# Patient Record
Sex: MALE | Employment: OTHER | URBAN - METROPOLITAN AREA
[De-identification: names, ages, dates, MRNs, and addresses within clinical notes are randomized per-mention and may not be internally consistent; named-entity substitution may affect disease eponyms.]

---

## 2022-09-26 ENCOUNTER — EVALUATION (OUTPATIENT)
Dept: PHYSICAL THERAPY | Facility: CLINIC | Age: 72
End: 2022-09-26
Payer: MEDICARE

## 2022-09-26 DIAGNOSIS — M17.12 ARTHRITIS OF KNEE, LEFT: Primary | ICD-10-CM

## 2022-09-26 DIAGNOSIS — Z96.652 STATUS POST TOTAL LEFT KNEE REPLACEMENT: ICD-10-CM

## 2022-09-26 PROCEDURE — 97161 PT EVAL LOW COMPLEX 20 MIN: CPT

## 2022-09-26 PROCEDURE — 97110 THERAPEUTIC EXERCISES: CPT

## 2022-09-26 NOTE — PROGRESS NOTES
PT Evaluation     Today's date: 2022  Patient name: Kenji Sheridan  : 1950  MRN: [id number]  Referring provider: Raphael Kaur DO  Dx: No diagnosis found  Assessment  Assessment details: Pt is a 81year old male that presents to outpatient physical therapy with a L TKA that occurred on 2022  Pt demonstrates increased pain, decreased ROM, decreased strength, decreased activity tolerance, and decreased functional activity due to pain and limited ROM  Education was given on typical physiological response following a TKA as well as the benefit of completing HEP  Pt expressed understanding with HEP  Pt would benefit from skilled physical therapy to address noted impairments, meet patient's goals, and to return to [facility]  Thank you for this referral     Impairments: abnormal gait, abnormal muscle firing, abnormal muscle tone, abnormal or restricted ROM, activity intolerance, impaired physical strength, lacks appropriate home exercise program, pain with function and safety issue    Symptom irritability: lowUnderstanding of Dx/Px/POC: good   Prognosis: good    Goals  STGs:   1  Pt will be able to demonstrate an increase of strength by at least 1/2 grade within 4 weeks   2  Pt will be able to achieve increased ROM by at least 25% within 4 weeks  3  Pt will be able to demonstrate a decrease in swelling by at least 5 cm within 4 weeks   4  Pt will be able to ambulate independently without AD for at least 150 ft within 4 weeks     LTGs:   1  Pt will be independent with all IADLs without pain or discomfort upon discharge  2  Pt will be independent with HEP upon discharge   3  Pt will be able to ambulate at least 300 ft independently without AD without pain upon discharge   4  Pt will be able to report no pain or discomfort with all work duties and recreational activities for a full day upon discharge  5   Pt will be able to achieve at least 120 degrees of PROM to L knee upon discharge Plan  Plan details: Visits: 2-3x a week   Duration: 10-12 weeks   Patient would benefit from: PT eval and skilled physical therapy  Planned modality interventions: cryotherapy, electrical stimulation/Russian stimulation, low level laser therapy, shortwave diathermy, manual electrical stimulation, TENS, thermotherapy: hydrocollator packs, ultrasound and unattended electrical stimulation  Planned therapy interventions: abdominal trunk stabilization, IADL retraining, joint mobilization, manual therapy, massage, Wong taping, muscle pump exercises, neuromuscular re-education, patient education, strengthening, stretching, therapeutic activities, therapeutic exercise, therapeutic training, home exercise program, functional ROM exercises, flexibility, community reintegration, breathing training, balance/weight bearing training, balance, activity modification, ADL retraining, dressing changes and body mechanics training  Treatment plan discussed with: patient        Subjective Evaluation    History of Present Illness  Mechanism of injury: Pt presents to physical therapy S/P L TKA on 9/02/2022  Pt reports that he had 3 weeks of home health therapy  Reports that he is doing well and has been completing the exercises at home daily  Indicates that he is most limited with knee flexion  Ambulates into clinic with Parviz Mckinney  He states that both of his knees have been painful for the last few years, but his R has not been bad since getting a steroid injection  He states that his foot gets numb on occasion since the surgery     Denies redness, denies calf pain    AGGS: prolonged standing, walking, knee flexion  EASES: rest, ice, medication   Goals: decrease pain, return to golf, increase motion   Pain  Current pain rating: 3  At best pain rating: 3  At worst pain rating: 10          Objective     Neurological Testing     Sensation     Knee   Left Knee   Intact: light touch    Right Knee   Intact: light touch     Passive Range of Motion   Left Knee   Flexion: 85 degrees with pain  Extension: -1 degrees     Mobility   Patellar Mobility:   Left Knee   Hypomobile: left medial, left lateral, left superior and left inferior    Tests     Additional Tests Details  TU seconds   5x STS: 27 seconds with SPC    Swelling     Left Knee Girth Measurement (cm)   Joint line: 48 cm  10 cm above joint line: 52 cm  10 cm below joint line: 44 cm             Precautions: L TKA on 2022    Daily Treatment Diary    Date             FOTO IE -             Re-Eval IE               Manuals    Knee PROM             Scar mobs             Patellar mobs                          Neuro Re-Ed                                                                                                Ther Ex    Bike             Quad set              SLR             Bridge             Mini squat 2x10            Calf raises 2x10                                                                             Ther Activity    Step ups/down 4" 10x ea            Step ups 6" 2x10                                                   Gait Training                              Modalities

## 2022-09-28 ENCOUNTER — OFFICE VISIT (OUTPATIENT)
Dept: PHYSICAL THERAPY | Facility: CLINIC | Age: 72
End: 2022-09-28
Payer: MEDICARE

## 2022-09-28 DIAGNOSIS — Z96.652 STATUS POST TOTAL LEFT KNEE REPLACEMENT: ICD-10-CM

## 2022-09-28 DIAGNOSIS — M17.12 ARTHRITIS OF KNEE, LEFT: Primary | ICD-10-CM

## 2022-09-28 PROCEDURE — 97110 THERAPEUTIC EXERCISES: CPT

## 2022-09-28 PROCEDURE — 97530 THERAPEUTIC ACTIVITIES: CPT

## 2022-09-28 PROCEDURE — 97140 MANUAL THERAPY 1/> REGIONS: CPT

## 2022-09-28 NOTE — PROGRESS NOTES
Daily Note     Today's date: 2022  Patient name: Nellie Frank  : 1950  MRN: [id number]  Referring provider: Laura Muñoz DO  Dx:   Encounter Diagnosis     ICD-10-CM    1  Arthritis of knee, left  M17 12    2  Status post total left knee replacement  Z96 652        Start Time: 8239  Stop Time: 1248  Total time in clinic (min): 53 minutes        Subjective: Pt states that he is doing alright today  He states that he has been performing the HEP faithfully  Objective: See treatment diary below      Assessment: Tolerated treatment well  Pt expressed pain with passive knee flexion; achieved 94 degrees of PROM  Activities were also performed to increase LE strength, especially quad strength, to increase functional mobility  Recommended to continue to perform HEP  Gait training was performed without AD; decreased knee flexion on L knee with swing phase of gait  Able to achieve TKE with heel strike  Patient demonstrated fatigue post treatment and would benefit from continued PT      Plan: Continue per plan of care        Precautions: L TKA on 2022    Daily Treatment Diary    Date            FOTO IE -             Re-Eval IE               Manuals    Knee PROM  JM           Scar mobs  JM           Patellar mobs  JM                        Neuro Re-Ed                                            Ther Ex    Bike             Quad set              SLR  2x10           Bridge             Mini squat 2x10 2x10           Calf raises 2x10 2x10           Hip abd machine  2x10 25#           Leg press   3x10 95#    SL 2x10 45#                                                  Ther Activity    Step ups/down 4" 10x ea 6" 10x ea           Step ups 6" 2x10 6" 10x ea                                                  Gait Training                              Modalities

## 2022-09-30 ENCOUNTER — OFFICE VISIT (OUTPATIENT)
Dept: PHYSICAL THERAPY | Facility: CLINIC | Age: 72
End: 2022-09-30
Payer: MEDICARE

## 2022-09-30 DIAGNOSIS — Z96.652 STATUS POST TOTAL LEFT KNEE REPLACEMENT: ICD-10-CM

## 2022-09-30 DIAGNOSIS — M17.12 ARTHRITIS OF KNEE, LEFT: Primary | ICD-10-CM

## 2022-09-30 PROCEDURE — 97110 THERAPEUTIC EXERCISES: CPT

## 2022-09-30 PROCEDURE — 97140 MANUAL THERAPY 1/> REGIONS: CPT

## 2022-09-30 NOTE — PROGRESS NOTES
Daily Note     Today's date: 2022  Patient name: Zayda Kate  : 1950  MRN: [id number]  Referring provider: Dylan Larry DO  Dx:   Encounter Diagnosis     ICD-10-CM    1  Arthritis of knee, left  M17 12    2  Status post total left knee replacement  Z96 652                   Subjective: Pt states that he was in pain following the last session, but slowly started feeling better the next few days  Objective: See treatment diary below      Assessment: Tolerated treatment well  Continues to demonstrate improve ROM with manual techniques  Lacks full functional ROM  Expresses pain with step ups/down on 6" step  Unable to perform eccentric step down due to quad weakness and pain  Recommended to continue HEP and use ice at home  Patient demonstrated fatigue post treatment and would benefit from continued PT      Plan: Continue per plan of care        Precautions: L TKA on 2022    Daily Treatment Diary    Date           FOTO IE -             Re-Eval IE               Manuals    Knee PROM  JM JM          Scar mobs  PAULINO JM          Patellar mobs  PAULINO JM                       Neuro Re-Ed                                            Ther Ex    Bike             Quad set    2x10          SLR  2x10 2x10          Bridge   NV          Mini squat 2x10 2x10 2x10          Calf raises 2x10 2x10 20x          Hip abd machine  2x10 25# Hip abd + ext 10x ea (B)          Leg press   3x10 95#    SL 2x10 45# 3x10 105#    SL 2x10 55#                                                 Ther Activity    Step ups/down 4" 10x ea 6" 10x ea 6" 10x ea          Step ups 6" 2x10 6" 10x ea           Eccentric heel taps   Unable                                     Gait Training                              Modalities    ICE  Perf Perf

## 2022-10-03 ENCOUNTER — OFFICE VISIT (OUTPATIENT)
Dept: PHYSICAL THERAPY | Facility: CLINIC | Age: 72
End: 2022-10-03
Payer: MEDICARE

## 2022-10-03 DIAGNOSIS — M17.12 ARTHRITIS OF KNEE, LEFT: Primary | ICD-10-CM

## 2022-10-03 DIAGNOSIS — Z96.652 STATUS POST TOTAL LEFT KNEE REPLACEMENT: ICD-10-CM

## 2022-10-03 PROCEDURE — 97110 THERAPEUTIC EXERCISES: CPT

## 2022-10-03 PROCEDURE — 97140 MANUAL THERAPY 1/> REGIONS: CPT

## 2022-10-03 NOTE — PROGRESS NOTES
Daily Note     Today's date: 10/3/2022  Patient name: Thu Salas  : 1950  MRN: [id number]  Referring provider: Alexi Zamora DO  Dx:   Encounter Diagnosis     ICD-10-CM    1  Arthritis of knee, left  M17 12    2  Status post total left knee replacement  Z96 652                   Subjective: Pt states that his knee was in a lot of pain over the weekend  Attributes this to the weather  Indicates that he was unable to perform any exercises or stretches due to the pain  Objective: See treatment diary below      Assessment: Tolerated treatment well  Manual therapy was performed to continue to improve knee ROM  Continues to demonstate limited functional knee flexion  Activities progressed to improve LE strength  Unable to perform lateral step up due to pain and weakness  Recommended to continue HEP to improve strength and ROM  Patient demonstrated fatigue post treatment and would benefit from continued PT      Plan: Continue per plan of care        Precautions: L TKA on 2022    Daily Treatment Diary    Date 9/26 9/28 9/30 10/3         FOTO IE -             Re-Eval IE               Manuals    Knee PROM  PAULINO BOB         Scar [place] BOB JM         Patellar mobs  PAULINO BOB JM                      Neuro Re-Ed                                            Ther Ex    Bike             Quad set    2x10 2x10         SLR  2x10 2x10 2x10         Bridge    NV         Mini squat 2x10 2x10 2x10 10x         Calf raises 2x10 2x10 20x          Hip abd machine  2x10 25# Hip abd + ext 10x ea (B)          Leg press   3x10 95#    SL 2x10 45# 3x10 105#    SL 2x10 55#          SAQ    2# 2x10                                   Ther Activity    Step ups/down 4" 10x ea 6" 10x ea 6" 10x ea 6" 10x ea         Step ups 6" 2x10 6" 10x ea           Eccentric heel taps   Unable  4" lateral 15x                                    Gait Training                              Modalities    ICE  Perf Perf

## 2022-10-05 ENCOUNTER — APPOINTMENT (OUTPATIENT)
Dept: PHYSICAL THERAPY | Facility: CLINIC | Age: 72
End: 2022-10-05

## 2022-10-07 ENCOUNTER — OFFICE VISIT (OUTPATIENT)
Dept: PHYSICAL THERAPY | Facility: CLINIC | Age: 72
End: 2022-10-07
Payer: MEDICARE

## 2022-10-07 DIAGNOSIS — M17.12 ARTHRITIS OF KNEE, LEFT: Primary | ICD-10-CM

## 2022-10-07 DIAGNOSIS — Z96.652 STATUS POST TOTAL LEFT KNEE REPLACEMENT: ICD-10-CM

## 2022-10-07 PROCEDURE — 97140 MANUAL THERAPY 1/> REGIONS: CPT

## 2022-10-07 PROCEDURE — 97110 THERAPEUTIC EXERCISES: CPT

## 2022-10-07 NOTE — PROGRESS NOTES
Daily Note     Today's date: 10/7/2022  Patient name: Cha Davey  : 1950  MRN: [id number]  Referring provider: Masha Maria DO  Dx:   Encounter Diagnosis     ICD-10-CM    1  Arthritis of knee, left  M17 12    2  Status post total left knee replacement  Z96 652                   Subjective: Pt states that he was in a lot of pain on Monday and Tuesday this week, but has been feeling much better since  Indicates that he was been performing the HEP  Ambulates into clinic without AD      Objective: See treatment diary below      Assessment: Tolerated treatment well  Progressed weight and reps with exercises today  Demonstrated decreased quad strength and knee ROM with eccentric step down  Recommended to add step down on 4" step to HEP  Able to achieve 100 degrees of PROM  Patient demonstrated fatigue post treatment and would benefit from continued PT      Plan: Continue per plan of care        Precautions: L TKA on 2022    Daily Treatment Diary    Date 9/26 9/28 9/30 10/3 10/7        FOTO IE -             Re-Eval IE               Manuals    Knee PROM  PAULINO BOB JM        Scar mobs  PAULINO BOB JM        Patellar mobs  PAULINO BOB JM                     Neuro Re-Ed                                            Ther Ex    Bike             Quad set    2x10 2x10 2x10        SLR  2x10 2x10 2x10 3x7        Bridge    NV 2x8        Mini squat 2x10 2x10 2x10 10x         Calf raises 2x10 2x10 20x  20x        Hip abd machine  2x10 25# Hip abd + ext 10x ea (B)  Hip abd + ext 10x ea (B)        Leg press   3x10 95#    SL 2x10 45# 3x10 105#    SL 2x10 55#  3x10 115#    SL 2x10 55#        SAQ     2 5# 2x10        LAQ     2 5# 2x10                     Ther Activity    Step ups/down 4" 10x ea 6" 10x ea 6" 10x ea 6" 10x ea         Step ups 6" 2x10 6" 10x ea           Eccentric heel taps   Unable  4" lateral 15x  4" up and over 20x                                  Gait Training                              Modalities    ICE Perf Perf  Perf

## 2022-10-10 ENCOUNTER — OFFICE VISIT (OUTPATIENT)
Dept: PHYSICAL THERAPY | Facility: CLINIC | Age: 72
End: 2022-10-10
Payer: MEDICARE

## 2022-10-10 DIAGNOSIS — Z96.652 STATUS POST TOTAL LEFT KNEE REPLACEMENT: ICD-10-CM

## 2022-10-10 DIAGNOSIS — M17.12 ARTHRITIS OF KNEE, LEFT: Primary | ICD-10-CM

## 2022-10-10 PROCEDURE — 97530 THERAPEUTIC ACTIVITIES: CPT

## 2022-10-10 PROCEDURE — 97140 MANUAL THERAPY 1/> REGIONS: CPT

## 2022-10-10 PROCEDURE — 97110 THERAPEUTIC EXERCISES: CPT

## 2022-10-10 NOTE — PROGRESS NOTES
Daily Note     Today's date: 10/10/2022  Patient name: Darin Yao  : 1950  MRN: [id number]  Referring provider: Isabelle Sanchez DO  Dx:   Encounter Diagnosis     ICD-10-CM    1  Arthritis of knee, left  M17 12    2  Status post total left knee replacement  Z96 652                   Subjective: Pt states that he is doing well  Reports that he can go up the stairs much easier, but still has to descend with UE support and one step at a time leading with the L LE  Objective: See treatment diary below      Assessment: Tolerated treatment well  Tolerated increased activities today with less rest breaks required  Able to achieve 104 degrees of passive knee flexion; lack 4 degrees of passive TKE  Continues to demonstrate limited quad strength with eccentric step down  Reported discomfort in R knee; indicates history of R knee pain  Recommended to continue HEP  Patient demonstrated fatigue post treatment and would benefit from continued PT      Plan: Continue per plan of care        Precautions: L TKA on 2022    Daily Treatment Diary    Date 9/26 9/28 9/30 10/3 10/7 10/10       FOTO IE -             Re-Eval IE               Manuals    Knee PROM  JM PAULINO BOB JM       Scar mobs  PAULINO BOB JM JM       Patellar mobs  PAULINO BOB JM                    Neuro Re-Ed           30"x2                                 Ther Ex    Bike             Quad set    2x10 2x10 2x10        SLR  2x10 2x10 2x10 3x7 2x10       Bridge    NV 2x8        Mini squat 2x10 2x10 2x10 10x         Calf raises 2x10 2x10 20x  20x 20x + toes raises 20x       Hip abd machine  2x10 25# Hip abd + ext 10x ea (B)  Hip abd + ext 10x ea (B) Hip abd 40# 2x10 ea       Leg press   3x10 95#    SL 2x10 45# 3x10 105#    SL 2x10 55#  3x10 115#    SL 2x10 55# 3x10 125#    SL 2x10 55#       SAQ     2 5# 2x10 DC       LAQ     2 5# 2x10 2x10 3#                    Ther Activity    Step ups/down 4" 10x ea 6" 10x ea 6" 10x ea 6" 10x ea  6" 10x ea       Step ups 6" 2x10 6" 10x ea           Eccentric heel taps   Unable  4" lateral 15x  4" up and over 20x 4" up and over 20x                                 Gait Training                              Modalities    ICE  Perf Perf  Perf

## 2022-10-12 ENCOUNTER — OFFICE VISIT (OUTPATIENT)
Dept: PHYSICAL THERAPY | Facility: CLINIC | Age: 72
End: 2022-10-12
Payer: MEDICARE

## 2022-10-12 DIAGNOSIS — M17.12 ARTHRITIS OF KNEE, LEFT: Primary | ICD-10-CM

## 2022-10-12 DIAGNOSIS — Z96.652 STATUS POST TOTAL LEFT KNEE REPLACEMENT: ICD-10-CM

## 2022-10-12 PROCEDURE — 97140 MANUAL THERAPY 1/> REGIONS: CPT

## 2022-10-12 PROCEDURE — 97110 THERAPEUTIC EXERCISES: CPT

## 2022-10-12 NOTE — PROGRESS NOTES
Daily Note     Today's date: 10/12/2022  Patient name: Shikha Watkins  : 1950  MRN: [id number]  Referring provider: Salinas Barrera DO  Dx:   Encounter Diagnosis     ICD-10-CM    1  Arthritis of knee, left  M17 12    2  Status post total left knee replacement  Z96 652                   Subjective: Pt states that his R knee is bothering him more than his L  Reports that his knees worse in the morning, but slowly improve throughout the morning  Indicates that he was a little sore following last session  Objective: See treatment diary below    Passive Range of Motion   Left Knee   Flexion: 106 degrees with pain (85)  Extension: -3 degrees         Tests   Additional Tests Details  TU seconds (18)  5x STS: 18 seconds with UE support (27)     Swelling    Left Knee Girth Measurement (cm)   Joint line: 47 cm (48)  10 cm above joint line: 51 cm (52)  10 cm below joint line: 43 cm (44)    FOTO:     IE - 40  10/12/22 -       Assessment: Tolerated treatment well  Continues to show improvement since starting physical therapy  ROM listed above  Continues to lack functional ROM and strength  Able to performed eccentric step down without UE support on 4" step  Limited stepping activities today due to pain in R knee  Patient demonstrated fatigue post treatment and would benefit from continued PT      Plan: Continue per plan of care        Precautions: L TKA on 2022    Daily Treatment Diary    Date 9/26 9/28 9/30 10/3 10/7 10/10 10/12      FOTO IE -             Re-Eval IE               Manuals    Knee PROM  PAULINO BOB JM      Scar mobs  PAULINO BOB JM      Patellar mobs  PAULINO BOB JM                   Neuro Re-Ed                                            Ther Ex    Bike             Quad set    2x10 2x10 2x10        SLR  2x10 2x10 2x10 3x7 2x10 2x10      Bridge    NV 2x8  2x12      Mini squat 2x10 2x10 2x10 10x         Calf raises 2x10 2x10 20x  20x 20x + toes raises 20x 20x + toes raises 20x Hip abd machine  2x10 25# Hip abd + ext 10x ea (B)  Hip abd + ext 10x ea (B) Hip abd 40# 2x10 ea Hip abd only 2x10 ea      Leg press   3x10 95#    SL 2x10 45# 3x10 105#    SL 2x10 55#  3x10 115#    SL 2x10 55# 3x10 125#    SL 2x10 55# 3x10 135#    SL 2x10 65#      SAQ     2 5# 2x10 DC 4# 2x10      LAQ     2 5# 2x10 2x10 3# 2x10 4#      Lunge stretch on step       10"x10                                             Ther Activity    Step ups/down 4" 10x ea 6" 10x ea 6" 10x ea 6" 10x ea  6" 10x ea       Step ups 6" 2x10 6" 10x ea           Eccentric heel taps   Unable  4" lateral 15x  4" up and over 20x 4" up and over 20x 4" up and over 15x                                Gait Training                              Modalities    ICE  Perf Perf  Perf

## 2022-10-14 ENCOUNTER — OFFICE VISIT (OUTPATIENT)
Dept: PHYSICAL THERAPY | Facility: CLINIC | Age: 72
End: 2022-10-14
Payer: MEDICARE

## 2022-10-14 DIAGNOSIS — M17.12 ARTHRITIS OF KNEE, LEFT: Primary | ICD-10-CM

## 2022-10-14 DIAGNOSIS — Z96.652 STATUS POST TOTAL LEFT KNEE REPLACEMENT: ICD-10-CM

## 2022-10-14 PROCEDURE — 97140 MANUAL THERAPY 1/> REGIONS: CPT

## 2022-10-14 PROCEDURE — 97110 THERAPEUTIC EXERCISES: CPT

## 2022-10-14 PROCEDURE — 97530 THERAPEUTIC ACTIVITIES: CPT

## 2022-10-14 NOTE — PROGRESS NOTES
Daily Note     Today's date: 10/14/2022  Patient name: Prince Dorsey  : 1950  MRN: [id number]  Referring provider: Andres Low DO  Dx:   Encounter Diagnosis     ICD-10-CM    1  Arthritis of knee, left  M17 12    2  Status post total left knee replacement  Z96 652                   Subjective: Pt states that he is feeling 'wonderful' today  Indicates that he still has been having pain with descending stairs, but notices other activities that are getting easier to perform at home  Objective: See treatment diary below      Assessment: Tolerated treatment well  Able to achieve 109 degrees of passive knee flexion; lack 1 degrees of passive TKE  Continues to demonstrate limited quad strength with eccentric step down  Unable to perform step down without pain on 4" step  Recommended to continue to challenge self with stepping down at home to improve strength and confidence  Patient demonstrated fatigue post treatment and would benefit from continued PT      Plan: Continue per plan of care        Precautions: L TKA on 2022    Daily Treatment Diary    Date 9/26 9/28 9/30 10/3 10/7 10/10 10/12 10/14     FOTO IE -             Re-Eval IE               Manuals    Knee PROM  JM JM JM JM JM JM JM     Scar mobs  JM JM JM PAULINO JM JM JM     Patellar mobs  JM PAULINO BOB JM JM JM JM                  Neuro Re-Ed     Tandem stance on foam         60"x1 ea                               Ther Ex    Bike        3 min     Quad set    2x10 2x10 2x10        SLR  2x10 2x10 2x10 3x7 2x10 2x10 2x10 3#     Bridge    NV 2x8  2x12      Mini squat 2x10 2x10 2x10 10x    On foam 2x10     Calf raises 2x10 2x10 20x  20x 20x + toes raises 20x 20x + toes raises 20x 30x + toes raises 20x     Hip abd machine  2x10 25# Hip abd + ext 10x ea (B)  Hip abd + ext 10x ea (B) Hip abd 40# 2x10 ea Hip abd only 2x10 ea Hip abd only 2x10 ea 55#     Leg press   3x10 95#    SL 2x10 45# 3x10 105#    SL 2x10 55#  3x10 115#    SL 2x10 55# 3x10 125#    SL 2x10 55# 3x10 135#    SL 2x10 65# 3x10 155#    SL 2x10 65#     SAQ     2 5# 2x10 DC 4# 2x10      LAQ     2 5# 2x10 2x10 3# 2x10 4# 2x10 5#     Lunge stretch on step       10"x10 10"x10     Side lying hip abd        15x 3#                               Ther Activity    Step ups/down 4" 10x ea 6" 10x ea 6" 10x ea 6" 10x ea  6" 10x ea       Step ups 6" 2x10 6" 10x ea           Eccentric heel taps   Unable  4" lateral 15x  4" up and over 20x 4" up and over 20x 4" up and over 15x 4" up and over 15x    2x5 (L)                               Gait Training                              Modalities    ICE  Perf Perf  Perf   Perf

## 2022-10-17 ENCOUNTER — OFFICE VISIT (OUTPATIENT)
Dept: PHYSICAL THERAPY | Facility: CLINIC | Age: 72
End: 2022-10-17
Payer: MEDICARE

## 2022-10-17 DIAGNOSIS — M17.12 ARTHRITIS OF KNEE, LEFT: Primary | ICD-10-CM

## 2022-10-17 DIAGNOSIS — Z96.652 STATUS POST TOTAL LEFT KNEE REPLACEMENT: ICD-10-CM

## 2022-10-17 PROCEDURE — 97110 THERAPEUTIC EXERCISES: CPT

## 2022-10-17 PROCEDURE — 97530 THERAPEUTIC ACTIVITIES: CPT

## 2022-10-17 PROCEDURE — 97140 MANUAL THERAPY 1/> REGIONS: CPT

## 2022-10-17 NOTE — PROGRESS NOTES
Daily Note     Today's date: 10/17/2022  Patient name: Mandy Bradley  : 1950  MRN: [id number]  Referring provider: Aarti Johnson DO  Dx:   Encounter Diagnosis     ICD-10-CM    1  Arthritis of knee, left  M17 12    2  Status post total left knee replacement  Z96 652                   Subjective: Pt states that he is doing well today  Denies any new changes since last session  Objective: See treatment diary below      Assessment: Tolerated treatment well  Continues to show good progression with therapeutic exercises with increase reps and weight  Lacks progression with functional step downs on L LE due to quad weakness  Education was given on benefit of adding step ups/downs to HEP  Patient demonstrated fatigue post treatment and would benefit from continued PT      Plan: Continue per plan of care        Precautions: L TKA on 2022    Daily Treatment Diary    Date 9/26 9/28 9/30 10/3 10/7 10/10 10/12 10/14 10/17    FOTO IE -             Re-Eval IE               Manuals    Knee PROM  PAULINO BOB JM    Scar mobs  PAULINO BOB JM    Patellar mobs  PAULINO BOB JM                 Neuro Re-Ed     Tandem stance on foam         60"x1 ea                               Ther Ex    Bike        3 min 5 min    Quad set    2x10 2x10 2x10        SLR  2x10 2x10 2x10 3x7 2x10 2x10 2x10 3# 2x10 3#    Bridge    NV 2x8  2x12      Mini squat 2x10 2x10 2x10 10x    On foam 2x10 2x10    Calf raises 2x10 2x10 20x  20x 20x + toes raises 20x 20x + toes raises 20x 30x + toes raises 20x 20x 30# + toes raises 20x    Hip abd machine  2x10 25# Hip abd + ext 10x ea (B)  Hip abd + ext 10x ea (B) Hip abd 40# 2x10 ea Hip abd only 2x10 ea Hip abd only 2x10 ea 55# Hip abd only 2x10 ea 55#    Leg press   3x10 95#    SL 2x10 45# 3x10 105#    SL 2x10 55#  3x10 115#    SL 2x10 55# 3x10 125#    SL 2x10 55# 3x10 135#    SL 2x10 65# 3x10 155#    SL 2x10 65# 3x10 185#    SL 3x10 75#    SAQ     2 5# 2x10 DC 4# 2x10 LAQ     2 5# 2x10 2x10 3# 2x10 4# 2x10 5# 2x10 7#    Lunge stretch on step       10"x10 10"x10     Side lying hip abd        15x 3#                               Ther Activity    Step ups/down 4" 10x ea 6" 10x ea 6" 10x ea 6" 10x ea  6" 10x ea   6" 15x ea    Step ups 6" 2x10 6" 10x ea           Eccentric heel taps   Unable  4" lateral 15x  4" up and over 20x 4" up and over 20x 4" up and over 15x 4" up and over 15x    2x5 (L) 4" up and over 15x                              Gait Training                              Modalities    ICE  Perf Perf  Perf   Perf

## 2022-10-19 ENCOUNTER — OFFICE VISIT (OUTPATIENT)
Dept: PHYSICAL THERAPY | Facility: CLINIC | Age: 72
End: 2022-10-19
Payer: MEDICARE

## 2022-10-19 DIAGNOSIS — Z96.652 STATUS POST TOTAL LEFT KNEE REPLACEMENT: ICD-10-CM

## 2022-10-19 DIAGNOSIS — M17.12 ARTHRITIS OF KNEE, LEFT: Primary | ICD-10-CM

## 2022-10-19 PROCEDURE — 97140 MANUAL THERAPY 1/> REGIONS: CPT

## 2022-10-19 PROCEDURE — 97110 THERAPEUTIC EXERCISES: CPT

## 2022-10-19 NOTE — PROGRESS NOTES
Daily Note     Today's date: 10/19/2022  Patient name: Trang Molina  : 1950  MRN: [id number]  Referring provider: Fracisco Cheema DO  Dx:   Encounter Diagnosis     ICD-10-CM    1  Arthritis of knee, left  M17 12    2  Status post total left knee replacement  Z96 652                   Subjective: Pt states that he was very sore following last session on Monday  Reports that he still has some achy feeling today  Objective: See treatment diary below    PROM knee flexion: 110 degrees         Assessment: Tolerated treatment well  Physical therapy slightly decreased strengthening activities today due to reports of symptoms prior to therapy  Manual therapies were performed to increase ROM; continues to show improve ROM  Able to perform step down on 6" step with UE support  Patient demonstrated fatigue post treatment and would benefit from continued PT      Plan: Continue per plan of care        Precautions: L TKA on 2022    Daily Treatment Diary    Date  10/3 10/7 10/10 10/12 10/14 10/17 10/19   FOTO IE -             Re-Eval IE               Manuals    Knee PROM  JM PAULINO BOB JM JM + prone   Scar mobs  JM JM PAULINO BOB JM JM   Patellar mobs  JM PAULINO BOB JM                Neuro Re-Ed     Tandem stance on foam         60"x1 ea                               Ther Ex    Bike        3 min 5 min 6 min   Quad set    2x10 2x10 2x10        SLR  2x10 2x10 2x10 3x7 2x10 2x10 2x10 3# 2x10 3# 2x10 3#   Bridge    NV 2x8  2x12   2x10   Mini squat 2x10 2x10 2x10 10x    On foam 2x10 2x10    Calf raises 2x10 2x10 20x  20x 20x + toes raises 20x 20x + toes raises 20x 30x + toes raises 20x 20x 30# + toes raises 20x    Hip abd machine  2x10 25# Hip abd + ext 10x ea (B)  Hip abd + ext 10x ea (B) Hip abd 40# 2x10 ea Hip abd only 2x10 ea Hip abd only 2x10 ea 55# Hip abd only 2x10 ea 55# Hip abd 40# 2x10 ea   Leg press   3x10 95#    SL 2x10 45# 3x10 105#    SL 2x10 55#  3x10 115#    SL 2x10 55# 3x10 125#    SL 2x10 55# 3x10 135#    SL 2x10 65# 3x10 155#    SL 2x10 65# 3x10 185#    SL 3x10 75# 3x10 185#    SL 3x10 75#   SAQ     2 5# 2x10 DC 4# 2x10      LAQ     2 5# 2x10 2x10 3# 2x10 4# 2x10 5# 2x10 7# 2x10 5#   Lunge stretch on step       10"x10 10"x10     Side lying hip abd        15x 3#  2x10 3#   Hamstring curl          3x10 80#                Ther Activity    Step ups/down 4" 10x ea 6" 10x ea 6" 10x ea 6" 10x ea  6" 10x ea   6" 15x ea 6" 15x ea   Step ups 6" 2x10 6" 10x ea           Eccentric heel taps   Unable  4" lateral 15x  4" up and over 20x 4" up and over 20x 4" up and over 15x 4" up and over 15x    2x5 (L) 4" up and over 15x                              Gait Training                              Modalities    ICE  Perf Perf  Perf   Perf Perf Perf

## 2022-10-21 ENCOUNTER — EVALUATION (OUTPATIENT)
Dept: PHYSICAL THERAPY | Facility: CLINIC | Age: 72
End: 2022-10-21
Payer: MEDICARE

## 2022-10-21 DIAGNOSIS — Z96.652 STATUS POST TOTAL LEFT KNEE REPLACEMENT: ICD-10-CM

## 2022-10-21 DIAGNOSIS — M17.12 ARTHRITIS OF KNEE, LEFT: Primary | ICD-10-CM

## 2022-10-21 PROCEDURE — 97110 THERAPEUTIC EXERCISES: CPT

## 2022-10-21 PROCEDURE — 97140 MANUAL THERAPY 1/> REGIONS: CPT

## 2022-10-21 PROCEDURE — 97530 THERAPEUTIC ACTIVITIES: CPT

## 2022-10-21 NOTE — LETTER
2022    DO Alejandro  [address] [facility]  [address] [place] [address]    Patient: Alfa Morales  YOB: 1950   Date of Visit: 10/21/2022     Encounter Diagnosis     ICD-10-CM    1  Arthritis of knee, left  M17 12    2  Status post total left knee replacement  Z96 652        Dear Dr Bindu Temple:    Thank you for your recent referral of Alfa Morales    Please review the attached evaluation summary from Jaleel's recent visit  Please verify that you agree with the plan of care by signing the attached order  If you have any questions or concerns, please do not hesitate to call  I sincerely appreciate the opportunity to share in the care of one of your patients and hope to have another opportunity to work with you in the near future  Sincerely,    Tamara Linares, PT      Referring Provider:      I certify that I have read the below Plan of Care and certify the need for these services furnished under this plan of treatment while under my care  DO Alejandro  [address] [place] [address]  [address] [place] [address]  Via Fax: [phone]          PT Re-Evaluation     Today's date: 10/21/2022  Patient name: Alfa Morales  : 1950  MRN: [id number]  Referring provider: Asha Em DO  Dx:   Encounter Diagnosis     ICD-10-CM    1  Arthritis of knee, left  M17 12    2  Status post total left knee replacement  Z96 [id number]                   Assessment  Assessment details: Pt is a 81year old male that presents to outpatient physical therapy with a L TKA that occurred on 2022  Pt has shown good progression with physical therapy  Has demonstrated improve ROM, decreased overall pain, and improve strength  Pt continues to demonstrate increased pain with activities that required functional knee flexion, decreased ROM, decreased functional strength, decreased activity tolerance, and decreased functional activity due to pain and limited ROM    Pt would benefit from continued skilled physical therapy to address noted impairments, meet patient's goals, and to return to [facility]  Thank you for this referral     Impairments: abnormal gait, abnormal muscle firing, abnormal muscle tone, abnormal or restricted ROM, activity intolerance, impaired physical strength, lacks appropriate home exercise program, pain with function and safety issue    Symptom irritability: lowUnderstanding of Dx/Px/POC: good   Prognosis: good    Goals  STGs: (updated 10/21/2022)  1  Pt will be able to demonstrate an increase of strength by at least 1/2 grade within 4 weeks  (MET)  2  Pt will be able to achieve increased ROM by at least 25% within 4 weeks  (MET)  3  Pt will be able to demonstrate a decrease in swelling by at least 5 cm within 4 weeks (MET)  4  Pt will be able to ambulate independently without AD for at least 150 ft within 4 weeks (MET)    5  Pt will be able to navigate at least three 6" steps without UE in a reciprocal pattern within 4 weeks (NEW goal)   6  Pt will be able to achieve at least 115 degrees of passive ROM within 4 weeks (NEW goals)       LTGs: (all PROGRESSING)  1  Pt will be independent with all IADLs without pain or discomfort upon discharge  2  Pt will be independent with HEP upon discharge   3  Pt will be able to ambulate at least 300 ft independently without AD without pain upon discharge   4  Pt will be able to report no pain or discomfort with all work duties and recreational activities for a full day upon discharge  5   Pt will be able to achieve at least 120 degrees of PROM to L knee upon discharge     Plan  Plan details: Visits: 2-3x a week   Duration: 7-8 weeks   Patient would benefit from: PT eval and skilled physical therapy  Planned modality interventions: cryotherapy, electrical stimulation/Russian stimulation, thermotherapy: hydrocollator packs and unattended electrical stimulation  Planned therapy interventions: abdominal trunk stabilization, IADL retraining, joint mobilization, manual therapy, massage, muscle pump exercises, neuromuscular re-education, patient education, strengthening, stretching, therapeutic activities, therapeutic exercise, therapeutic training, home exercise program, functional ROM exercises, flexibility, breathing training, balance/weight bearing training, balance, ADL retraining and body mechanics training  Treatment plan discussed with: patient        Subjective Evaluation    History of Present Illness  Mechanism of injury: Pt presents to physical therapy S/P L TKA on 2022  Pt reports that he had 3 weeks of home health therapy  Reports that he is doing well and has been completing the exercises at home daily  Indicates that he is most limited with knee flexion  Ambulates into clinic with [facility]  He states that both of his knees have been painful for the last few years, but his R has not been bad since getting a steroid injection  He states that his foot gets numb on occasion since the surgery  Denies redness, denies calf pain    AGGS: prolonged standing, walking, knee flexion  EASES: rest, ice, medication   Goals: decrease pain, return to golf, increase motion     Re-Evaluation (10/21/2022): Pt states that he feels that he is about 80% better since starting out patient physical therapy  States that     Pain  Current pain ratin  At best pain ratin  At worst pain ratin          Objective     Passive Range of Motion   Left Knee   Flexion: 112 degrees with pain  Extension: 0 degrees     Mobility   Patellar Mobility:   Left Knee   WFL: medial, lateral, superior and inferior       Strength/Myotome Testing     Left Knee   Flexion: 4+  Extension: 5  Quadriceps contraction: good    Right Knee   Normal strength  Quadriceps contraction: good    Tests     Additional Tests Details  TU seconds   5x STS: 19 seconds     Swelling     Left Knee Girth Measurement (cm)   Joint line: 47 cm  10 cm above joint line: 51 cm  10 cm below joint line: 42 cm             Precautions: L TKA on 9/02/2022    Daily Treatment Diary    Date 10/21     10/10 10/12 10/14 10/17 10/19   FOTO             Re-Eval                Manuals    Knee PROM JM     PAULINO BOB JM JM + prone   Scar mobs      PAULINO BOB JM   Patellar mobs PAULINO BOB JM JM                Neuro Re-Ed     Tandem stance on foam         60"x1 ea                               Ther Ex    Bike 6 min       3 min 5 min 6 min   Quad set              SLR      2x10 2x10 2x10 3# 2x10 3# 2x10 3#   Bridge 2x10      2x12   2x10   Mini squat        On foam 2x10 2x10    Calf raises      20x + toes raises 20x 20x + toes raises 20x 30x + toes raises 20x 20x 30# + toes raises 20x    Hip abd machine Hip abd only 2x10 ea 55#     Hip abd 40# 2x10 ea Hip abd only 2x10 ea Hip abd only 2x10 ea 55# Hip abd only 2x10 ea 55# Hip abd 40# 2x10 ea   Leg press  3x10 205#    SL 3x10 85#     3x10 125#    SL 2x10 55# 3x10 135#    SL 2x10 65# 3x10 155#    SL 2x10 65# 3x10 185#    SL 3x10 75# 3x10 185#    SL 3x10 75#   SAQ      DC 4# 2x10      LAQ      2x10 3# 2x10 4# 2x10 5# 2x10 7# 2x10 5#   Lunge stretch on step 10"x10      10"x10 10"x10     Side lying hip abd        15x 3#  2x10 3#   Hamstring curl 3x10 80#         3x10 80#                Ther Activity    Step ups/down 6" 15x ea     6" 10x ea   6" 15x ea 6" 15x ea   Step ups             Eccentric heel taps 6" 2x10     4" up and over 20x 4" up and over 15x 4" up and over 15x    2x5 (L) 4" up and over 15x                              Gait Training                              Modalities    ICE        Perf Perf Perf

## 2022-10-21 NOTE — PROGRESS NOTES
PT Re-Evaluation     Today's date: 10/21/2022  Patient name: Nanette Bronson  : 1950  MRN: [id number]  Referring provider: Tim Beach DO  Dx:   Encounter Diagnosis     ICD-10-CM    1  Arthritis of knee, left  M17 12    2  Status post total left knee replacement  Z96 652                   Assessment  Assessment details: Pt is a 81year old male that presents to outpatient physical therapy with a L TKA that occurred on 2022  Pt has shown good progression with physical therapy  Has demonstrated improve ROM, decreased overall pain, and improve strength  Pt continues to demonstrate increased pain with activities that required functional knee flexion, decreased ROM, decreased functional strength, decreased activity tolerance, and decreased functional activity due to pain and limited ROM  Pt would benefit from continued skilled physical therapy to address noted impairments, meet patient's goals, and to return to [facility]  Thank you for this referral     Impairments: abnormal gait, abnormal muscle firing, abnormal muscle tone, abnormal or restricted ROM, activity intolerance, impaired physical strength, lacks appropriate home exercise program, pain with function and safety issue    Symptom irritability: lowUnderstanding of Dx/Px/POC: good   Prognosis: good    Goals  STGs: (updated 10/21/2022)  1  Pt will be able to demonstrate an increase of strength by at least 1/2 grade within 4 weeks  (MET)  2  Pt will be able to achieve increased ROM by at least 25% within 4 weeks  (MET)  3  Pt will be able to demonstrate a decrease in swelling by at least 5 cm within 4 weeks (MET)  4  Pt will be able to ambulate independently without AD for at least 150 ft within 4 weeks (MET)    5  Pt will be able to navigate at least three 6" steps without UE in a reciprocal pattern within 4 weeks (NEW goal)   6   Pt will be able to achieve at least 115 degrees of passive ROM within 4 weeks (NEW goals)       LTGs: (all PROGRESSING)  1  Pt will be independent with all IADLs without pain or discomfort upon discharge  2  Pt will be independent with HEP upon discharge   3  Pt will be able to ambulate at least 300 ft independently without AD without pain upon discharge   4  Pt will be able to report no pain or discomfort with all work duties and recreational activities for a full day upon discharge  5  Pt will be able to achieve at least 120 degrees of PROM to L knee upon discharge     Plan  Plan details: Visits: 2-3x a week   Duration: 7-8 weeks   Patient would benefit from: PT eval and skilled physical therapy  Planned modality interventions: cryotherapy, electrical stimulation/Russian stimulation, thermotherapy: hydrocollator packs and unattended electrical stimulation  Planned therapy interventions: abdominal trunk stabilization, IADL retraining, joint mobilization, manual therapy, massage, muscle pump exercises, neuromuscular re-education, patient education, strengthening, stretching, therapeutic activities, therapeutic exercise, therapeutic training, home exercise program, functional ROM exercises, flexibility, breathing training, balance/weight bearing training, balance, ADL retraining and body mechanics training  Treatment plan discussed with: patient        Subjective Evaluation    History of Present Illness  Mechanism of injury: Pt presents to physical therapy S/P L TKA on 9/02/2022  Pt reports that he had 3 weeks of home health therapy  Reports that he is doing well and has been completing the exercises at home daily  Indicates that he is most limited with knee flexion  Ambulates into clinic with [facility]  He states that both of his knees have been painful for the last few years, but his R has not been bad since getting a steroid injection  He states that his foot gets numb on occasion since the surgery     Denies redness, denies calf pain    AGGS: prolonged standing, walking, knee flexion  EASES: rest, ice, medication   Goals: decrease pain, return to golf, increase motion     Re-Evaluation (10/21/2022): Pt states that he feels that he is about 80% better since starting out patient physical therapy  States that     Pain  Current pain ratin  At best pain ratin  At worst pain ratin          Objective     Passive Range of Motion   Left Knee   Flexion: 112 degrees with pain  Extension: 0 degrees     Mobility   Patellar Mobility:   Left Knee   WFL: medial, lateral, superior and inferior       Strength/Myotome Testing     Left Knee   Flexion: 4+  Extension: 5  Quadriceps contraction: good    Right Knee   Normal strength  Quadriceps contraction: good    Tests     Additional Tests Details  TU seconds   5x STS: 19 seconds     Swelling     Left Knee Girth Measurement (cm)   Joint line: 47 cm  10 cm above joint line: 51 cm  10 cm below joint line: 42 cm             Precautions: L TKA on 2022    Daily Treatment Diary    Date 10/21     10/10 10/12 10/14 10/17 10/19   FOTO             Re-Eval                Manuals    Knee PROM PAULINO BOB JM + prone   Scar mobs      PAULINO BOB JM   Patellar mobs PAULINO BOB JM                Neuro Re-Ed     Tandem stance on foam         60"x1 ea                               Ther Ex    Bike 6 min       3 min 5 min 6 min   Quad set              SLR      2x10 2x10 2x10 3# 2x10 3# 2x10 3#   Bridge 2x10      2x12   2x10   Mini squat        On foam 2x10 2x10    Calf raises      20x + toes raises 20x 20x + toes raises 20x 30x + toes raises 20x 20x 30# + toes raises 20x    Hip abd machine Hip abd only 2x10 ea 55#     Hip abd 40# 2x10 ea Hip abd only 2x10 ea Hip abd only 2x10 ea 55# Hip abd only 2x10 ea 55# Hip abd 40# 2x10 ea   Leg press  3x10 205#    SL 3x10 85#     3x10 125#    SL 2x10 55# 3x10 135#    SL 2x10 65# 3x10 155#    SL 2x10 65# 3x10 185#    SL 3x10 75# 3x10 185#    SL 3x10 75#   SAQ      DC 4# 2x10      LAQ      2x10 3# 2x10 4# 2x10 5# 2x10 7# 2x10 5#   Lunge stretch on step 10"x10      10"x10 10"x10     Side lying hip abd        15x 3#  2x10 3#   Hamstring curl 3x10 80#         3x10 80#                Ther Activity    Step ups/down 6" 15x ea     6" 10x ea   6" 15x ea 6" 15x ea   Step ups             Eccentric heel taps 6" 2x10     4" up and over 20x 4" up and over 15x 4" up and over 15x    2x5 (L) 4" up and over 15x                              Gait Training                              Modalities    ICE        Perf Perf Perf

## 2022-10-24 ENCOUNTER — OFFICE VISIT (OUTPATIENT)
Dept: PHYSICAL THERAPY | Facility: CLINIC | Age: 72
End: 2022-10-24
Payer: MEDICARE

## 2022-10-24 DIAGNOSIS — Z96.652 STATUS POST TOTAL LEFT KNEE REPLACEMENT: ICD-10-CM

## 2022-10-24 DIAGNOSIS — M17.12 ARTHRITIS OF KNEE, LEFT: Primary | ICD-10-CM

## 2022-10-24 PROCEDURE — 97112 NEUROMUSCULAR REEDUCATION: CPT

## 2022-10-24 PROCEDURE — 97110 THERAPEUTIC EXERCISES: CPT

## 2022-10-24 PROCEDURE — 97140 MANUAL THERAPY 1/> REGIONS: CPT

## 2022-10-26 ENCOUNTER — OFFICE VISIT (OUTPATIENT)
Dept: PHYSICAL THERAPY | Facility: CLINIC | Age: 72
End: 2022-10-26
Payer: MEDICARE

## 2022-10-26 DIAGNOSIS — M17.12 ARTHRITIS OF KNEE, LEFT: Primary | ICD-10-CM

## 2022-10-26 DIAGNOSIS — Z96.652 STATUS POST TOTAL LEFT KNEE REPLACEMENT: ICD-10-CM

## 2022-10-26 PROCEDURE — 97140 MANUAL THERAPY 1/> REGIONS: CPT

## 2022-10-26 PROCEDURE — 97110 THERAPEUTIC EXERCISES: CPT

## 2022-10-26 PROCEDURE — 97112 NEUROMUSCULAR REEDUCATION: CPT

## 2022-10-26 NOTE — PROGRESS NOTES
Daily Note     Today's date: 10/26/2022  Patient name: Gina Reobllar  : 1950  MRN: [id number]  Referring provider: Talia Zheng DO  Dx:   Encounter Diagnosis     ICD-10-CM    1  Arthritis of knee, left  M17 12    2  Status post total left knee replacement  Z96 [id number]                   Subjective: Pt states that he is doing well  Denies any changes since last session  Objective: See treatment diary below  PROM knee flexion: 117 degrees       Assessment: Tolerated treatment well  Continues to demonstrate improve knee ROM as well as LE strength  Able to perform 6" step down with adequate eccentric control with left knee  Unable to perform single leg stance with dynamic UE activities due to limited balance, bilaterally  Patient demonstrated fatigue post treatment and would benefit from continued PT      Plan: Continue per plan of care        Precautions: L TKA on 2022    Daily Treatment Diary    Date 10/21 10/24 10/26   10/10 10/12 10/14 10/17 10/19   FOTO             Re-Eval                Manuals    Knee PROM PAULINO BOB JM + prone   Scar mobs      PAULINO BOB JM   Patellar mobs PAULINO BOB JM                Neuro Re-Ed     Tandem stance on foam   30"x2 30"x1     60"x1 ea     SL on foam wi/ taps on cone  7x 2x5          SL cone pick ups  2x3 3x                       Ther Ex    Bike 6 min 6 min 8 min     3 min 5 min 6 min   Hip abd machine Hip abd only 2x10 ea 55#  Hip abd only 2x10 ea 55#   Hip abd 40# 2x10 ea Hip abd only 2x10 ea Hip abd only 2x10 ea 55# Hip abd only 2x10 ea 55# Hip abd 40# 2x10 ea   Leg press  3x10 205#    SL 3x10 85# 2x20 225#    SL 3x10 85# 2x15 225#    SL 2x20 125#   3x10 125#    SL 2x10 55# 3x10 135#    SL 2x10 65# 3x10 155#    SL 2x10 65# 3x10 185#    SL 3x10 75# 3x10 185#    SL 3x10 75#   Lunge stretch on step 10"x10      10"x10 10"x10     Hamstring curl 3x10 80# 3x10 80# 3x10 90#       3x10 80#                Ther Activity    Step ups/down 6" 15x ea 6" 15x ea    6" 10x ea   6" 15x ea 6" 15x ea   Step ups   8" 20x           Eccentric heel taps 6" 2x10 6" 2x10 6" 2x10   4" up and over 20x 4" up and over 15x 4" up and over 15x    2x5 (L) 4" up and over 15x    Sit to stands  2x10 w/ foam on seat                        Gait Training                              Modalities    ICE Perf Perf      Perf Perf Perf

## 2022-10-28 ENCOUNTER — OFFICE VISIT (OUTPATIENT)
Dept: PHYSICAL THERAPY | Facility: CLINIC | Age: 72
End: 2022-10-28
Payer: MEDICARE

## 2022-10-28 DIAGNOSIS — M17.12 ARTHRITIS OF KNEE, LEFT: Primary | ICD-10-CM

## 2022-10-28 DIAGNOSIS — Z96.652 STATUS POST TOTAL LEFT KNEE REPLACEMENT: ICD-10-CM

## 2022-10-28 PROCEDURE — 97140 MANUAL THERAPY 1/> REGIONS: CPT

## 2022-10-28 PROCEDURE — 97110 THERAPEUTIC EXERCISES: CPT

## 2022-10-28 PROCEDURE — 97530 THERAPEUTIC ACTIVITIES: CPT

## 2022-10-28 NOTE — PROGRESS NOTES
Daily Note     Today's date: 10/28/2022  Patient name: Alia Arriaga  : 1950  MRN: [id number]  Referring provider: Nik Blue DO  Dx:   Encounter Diagnosis     ICD-10-CM    1  Arthritis of knee, left  M17 12    2  Status post total left knee replacement  Z96 [id number]                   Subjective: Pt states that he is feeling stiff and sore today  Objective: See treatment diary below      Assessment: Tolerated treatment well  Continues to show good progression with ROM and strengthening activities  Still benefiting from outpatient vs home physical therapy only for use of specialized equipment, skilled progressions,  guarding with balance activities and manual therapy  Patient demonstrated fatigue post treatment and would benefit from continued PT      Plan: Continue per plan of care        Precautions: L TKA on 2022    Daily Treatment Diary    Date 10/21 10/24 10/26 10/28  10/10 10/12 10/14 10/17 10/19   FOTO             Re-Eval                Manuals    Knee PROM PAULINO BOB JM JM + prone   Scar mobs      PAULINO BOB JM   Patellar mobs PAULINO BOB JM                Neuro Re-Ed     Tandem stance on foam   30"x2 30"x1 30"x2    60"x1 ea     SL on foam wi/ taps on cone  7x 2x5          SL cone pick ups  2x3 3x                       Ther Ex    Bike 6 min 6 min 8 min 7 min    3 min 5 min 6 min   Hip abd machine Hip abd only 2x10 ea 55#  Hip abd only 2x10 ea 55# Hip abd only 2x10 ea 100#  Hip abd 40# 2x10 ea Hip abd only 2x10 ea Hip abd only 2x10 ea 55# Hip abd only 2x10 ea 55# Hip abd 40# 2x10 ea   Leg press  3x10 205#    SL 3x10 85# 2x20 225#    SL 3x10 85# 2x15 225#    SL 2x20 125# 2x15 265#      3x10 125#    SL 2x10 55# 3x10 135#    SL 2x10 65# 3x10 155#    SL 2x10 65# 3x10 185#    SL 3x10 75# 3x10 185#    SL 3x10 75#   Lunge stretch on step 10"x10      10"x10 10"x10     Hamstring curl 3x10 80# 3x10 80# 3x10 90# 2x20 95#      3x10 80#                Ther Activity    Step ups/down 6" 15x ea 6" 15x ea    6" 10x ea   6" 15x ea 6" 15x ea   Step ups   8" 20x  8" 20x     Lat   2x10 8"         Eccentric heel taps 6" 2x10 6" 2x10 6" 2x10 6" 2x10  4" up and over 20x 4" up and over 15x 4" up and over 15x    2x5 (L) 4" up and over 15x    Sit to stands  2x10 w/ foam on seat                        Gait Training                              Modalities    ICE Perf Perf R Bronson Juanito [address]

## 2022-11-02 ENCOUNTER — APPOINTMENT (OUTPATIENT)
Dept: PHYSICAL THERAPY | Facility: CLINIC | Age: 72
End: 2022-11-02

## 2022-11-04 ENCOUNTER — OFFICE VISIT (OUTPATIENT)
Dept: PHYSICAL THERAPY | Facility: CLINIC | Age: 72
End: 2022-11-04

## 2022-11-04 DIAGNOSIS — Z96.652 STATUS POST TOTAL LEFT KNEE REPLACEMENT: ICD-10-CM

## 2022-11-04 DIAGNOSIS — M17.12 ARTHRITIS OF KNEE, LEFT: Primary | ICD-10-CM

## 2022-11-04 NOTE — PROGRESS NOTES
Daily Note     Today's date: 2022  Patient name: Kurtis Anders  : 1950  MRN: [id number]  Referring provider: Claire Kumar DO  Dx:   Encounter Diagnosis     ICD-10-CM    1  Arthritis of knee, left  M17 12    2  Status post total left knee replacement  Z96 [id number]                   Subjective: Pt states that his left knee does not bother him much throughout the day  Indicates that he feels achy at night and stiff in the morning  Reports that he is getting an injection in the R knee next week  Objective: See treatment diary below      Assessment: Tolerated treatment well  Continues to demonstrate improve knee ROM as well as LE strength  Able to perform 6" step down with adequate eccentric control with left knee  States that the more eccentric controlled step downs he performed the easier they became  Also reports R knee pain throughout session  Patient demonstrated fatigue post treatment and would benefit from continued PT      Plan: Continue per plan of care        Precautions: L TKA on 2022    Daily Treatment Diary    Date 10/21 10/24 10/26 10/28 11/4        FOTO             Re-Eval                Manuals    Knee PROM PAULINO BOB JM        Scar mobs             Patellar mobs PAULINO BOB JM                     Neuro Re-Ed     Tandem stance on foam   30"x2 30"x1 30"x2         SL on foam wi/ taps on cone  7x 2x5          SL cone pick ups  2x3 3x                       Ther Ex    Bike 6 min 6 min 8 min 7 min 5 min         Hip abd machine Hip abd only 2x10 ea 55#  Hip abd only 2x10 ea 55# Hip abd only 2x10 ea 100# Hip abd only 2x10 ea 100#        Leg press  3x10 205#    SL 3x10 85# 2x20 225#    SL 3x10 85# 2x15 225#    SL 2x20 125# 2x15 265#     2x20 265#     SL 2x20 135#        Lunge stretch on step 10"x10            Hamstring curl 3x10 80# 3x10 80# 3x10 90# 2x20 95# 2x20 105#                     Ther Activity    Step ups/down 6" 15x ea 6" 15x ea   6" 2x10 ea        Step ups   8" 20x  8" 20x Lat  2x10 8" 6" 2x10 ea        Eccentric heel taps 6" 2x10 6" 2x10 6" 2x10 6" 2x10 4" 2x10        Sit to stands  2x10 w/ foam on seat                        Gait Training                              Modalities    ICE Perf Perf Perf Perf

## 2022-11-07 ENCOUNTER — OFFICE VISIT (OUTPATIENT)
Dept: PHYSICAL THERAPY | Facility: CLINIC | Age: 72
End: 2022-11-07

## 2022-11-07 DIAGNOSIS — M17.12 ARTHRITIS OF KNEE, LEFT: Primary | ICD-10-CM

## 2022-11-07 DIAGNOSIS — Z96.652 STATUS POST TOTAL LEFT KNEE REPLACEMENT: ICD-10-CM

## 2022-11-07 NOTE — PROGRESS NOTES
Daily Note     Today's date: 2022  Patient name: Teri Ndiaye  : 1950  MRN: [id number]  Referring provider: Fleeta Sacks, DO  Dx:   Encounter Diagnosis     ICD-10-CM    1  Arthritis of knee, left  M17 12    2  Status post total left knee replacement  [id number]                   Subjective: Pt denies changes since last session  Reports that he has a follow up with orthopedic this week  States that his R knee is worse than his L knee  Objective: See treatment diary below      Assessment: Tolerated treatment well  Continues strengthening and ROM activities to progress activity tolerance  Denied pain in L knee throughout session  Has shown good progression with physical therapy  ROM - WNL  Strength - WFL  Patient demonstrated fatigue post treatment and would benefit from continued PT      Plan: Continue per plan of care  Possible discharge next session due to meeting personal goals        Precautions: L TKA on 2022    Daily Treatment Diary    Date 10/21 10/24 10/26 10/28 11/4 11/7       FOTO             Re-Eval                Manuals    Knee PROM PAULINO BOB JM       Scar mobs             Patellar mobs PAULINO BOB JM                    Neuro Re-Ed     Tandem stance on foam   30"x2 30"x1 30"x2         SL on foam wi/ taps on cone  7x 2x5          SL cone pick ups  2x3 3x                       Ther Ex    Bike 6 min 6 min 8 min 7 min 5 min  5 min       Hip abd machine Hip abd only 2x10 ea 55#  Hip abd only 2x10 ea 55# Hip abd only 2x10 ea 100# Hip abd only 2x10 ea 100# Hip abd only 2x15 ea 100#    Hip flexion 100# 2x20 ea       Leg press  3x10 205#    SL 3x10 85# 2x20 225#    SL 3x10 85# 2x15 225#    SL 2x20 125# 2x15 265#     2x20 265#     SL 2x20 135# 2x20 265#     SL 2x20 135#       Lunge stretch on step 10"x10            Hamstring curl 3x10 80# 3x10 80# 3x10 90# 2x20 95# 2x20 105# 2x20 115#                    Ther Activity    Step ups/down 6" 15x ea 6" 15x ea   6" 2x10 ea 6" 2x10 ea Step ups   8" 20x  8" 20x     Lat   2x10 8" 6" 2x10 ea 8" 2x10 ea       Eccentric heel taps 6" 2x10 6" 2x10 6" 2x10 6" 2x10 4" 2x10 6" 2x10       Sit to stands  2x10 w/ foam on seat                        Gait Training                              Modalities    ICE Perf Perf Perf Perf

## 2022-11-09 ENCOUNTER — OFFICE VISIT (OUTPATIENT)
Dept: PHYSICAL THERAPY | Facility: CLINIC | Age: 72
End: 2022-11-09

## 2022-11-09 DIAGNOSIS — Z96.652 STATUS POST TOTAL LEFT KNEE REPLACEMENT: ICD-10-CM

## 2022-11-09 DIAGNOSIS — M17.12 ARTHRITIS OF KNEE, LEFT: Primary | ICD-10-CM

## 2022-11-09 NOTE — PROGRESS NOTES
Daily Note + Discharge Note     Today's date: 2022  Patient name: Lamar Lawson  : 1950  MRN: [id number]  Referring provider: Eusebio Ronquillo DO  Dx:   Encounter Diagnosis     ICD-10-CM    1  Arthritis of knee, left  M17 12    2  Status post total left knee replacement  [id number]                   Subjective: Pt states that he is feeling pretty good  Reports that he wants to make today his last session  Denies limitations with his L knee with his day to day  Reports pain in his R knee  Indicates that he has a follow up with orthopedic tomorrow  Objective: See treatment diary below    Knee Ext PROM: 0 degrees   Knee Flex PROM: 117 degrees     Additional Tests Details  TU seconds   5x STS: 19 seconds     Left Knee Girth Measurement (cm)   Joint line: 47 cm  10 cm above joint line: 51 cm  10 cm below joint line: 42 cm    Left Knee   Flexion: 5  Extension: 5  Quadriceps contraction: good      Assessment: Tolerated treatment well  Has shown good progression with physical therapy  Demonstrates slight limitation with eccentric step down from higher surface due to pain in R knee  Patient demonstrated fatigue post treatment and exhibited good technique with therapeutic exercises  Plan to discharge due to meeting personal goals  Plan:   Advised patient to continue with home exercise program which I reviewed with them today  Advised patient to contact me with any future questions or concerns regarding exercise  Pt is in agreement with discharge plan            Precautions: L TKA on 2022    Daily Treatment Diary    Date 10/21 10/24 10/26 10/28 11/4 11/7 11/9      FOTO             Re-Eval                Manuals    Knee PROM PAULINO BOB      Scar mobs             Patellar mobs PAULINO BOB                   Neuro Re-Ed     Tandem stance on foam   30"x2 30"x1 30"x2         SL on foam wi/ taps on cone  7x 2x5          SL cone pick ups  2x3 3x                       Ther Ex Bike 6 min 6 min 8 min 7 min 5 min  5 min 5 min      Hip abd machine Hip abd only 2x10 ea 55#  Hip abd only 2x10 ea 55# Hip abd only 2x10 ea 100# Hip abd only 2x10 ea 100# Hip abd only 2x15 ea 100#    Hip flexion 100# 2x20 ea Hip abd only 2x15 ea 100#    Hip flexion 100# 2x20 ea      Leg press  3x10 205#    SL 3x10 85# 2x20 225#    SL 3x10 85# 2x15 225#    SL 2x20 125# 2x15 265#     2x20 265#     SL 2x20 135# 2x20 265#     SL 2x20 135# 2x20 265#     SL 2x20 145#      Lunge stretch on step 10"x10            Hamstring curl 3x10 80# 3x10 80# 3x10 90# 2x20 95# 2x20 105# 2x20 115# 2x20 125#                   Ther Activity    Step ups/down 6" 15x ea 6" 15x ea   6" 2x10 ea 6" 2x10 ea       Step ups   8" 20x  8" 20x     Lat   2x10 8" 6" 2x10 ea 8" 2x10 ea       Eccentric heel taps 6" 2x10 6" 2x10 6" 2x10 6" 2x10 4" 2x10 6" 2x10       Sit to stands  2x10 w/ foam on seat                        Gait Training                              Modalities    ICE Perf Perf Perf Perf

## 2022-11-11 ENCOUNTER — APPOINTMENT (OUTPATIENT)
Dept: PHYSICAL THERAPY | Facility: CLINIC | Age: 72
End: 2022-11-11

## 2022-11-14 ENCOUNTER — APPOINTMENT (OUTPATIENT)
Dept: PHYSICAL THERAPY | Facility: CLINIC | Age: 72
End: 2022-11-14

## 2022-11-16 ENCOUNTER — APPOINTMENT (OUTPATIENT)
Dept: PHYSICAL THERAPY | Facility: CLINIC | Age: 72
End: 2022-11-16

## 2022-11-18 ENCOUNTER — APPOINTMENT (OUTPATIENT)
Dept: PHYSICAL THERAPY | Facility: CLINIC | Age: 72
End: 2022-11-18